# Patient Record
Sex: FEMALE | Race: AMERICAN INDIAN OR ALASKA NATIVE
[De-identification: names, ages, dates, MRNs, and addresses within clinical notes are randomized per-mention and may not be internally consistent; named-entity substitution may affect disease eponyms.]

---

## 2019-04-29 ENCOUNTER — HOSPITAL ENCOUNTER (OUTPATIENT)
Dept: HOSPITAL 5 - LAB | Age: 34
Discharge: HOME | End: 2019-04-29
Attending: INTERNAL MEDICINE
Payer: COMMERCIAL

## 2019-04-29 DIAGNOSIS — Z13.811: Primary | ICD-10-CM

## 2019-04-29 PROCEDURE — 85652 RBC SED RATE AUTOMATED: CPT

## 2019-04-29 PROCEDURE — 82784 ASSAY IGA/IGD/IGG/IGM EACH: CPT

## 2019-04-29 PROCEDURE — 36415 COLL VENOUS BLD VENIPUNCTURE: CPT

## 2020-03-05 NOTE — ULTRASOUND REPORT
COMPLETE BILATERAL BREAST ULTRASOUND 



HISTORY: 34-year-old with bilateral breast lumps. She describes a lump in the upper outer right breas
t for one month.



COMPARISON: None.



FINDINGS:  Complete sonographic evaluation of the right breast including imaging of the four quadrant
s and subareolar areas reveals no distinct abnormality. No mass, cyst or suspicious shadowing. Normal
 fibroglandular structures at 10:00 8 cm from the nipple correlate with the symptomatic area.



Complete sonographic evaluation of the left breast including imaging of the four quadrants and subare
olar areas reveals no mass or suspicious shadowing. A benign cyst at 11:30 o'clock 5 cm from the nipp
le measures 5 x 2 x 5 mm.



IMPRESSION:

Normal right breast and a single 5 mm left breast cyst at 11:30 o'clock 5 cm from the nipple.



If the clinical examination remains stable, recommend bilateral screening mammograms beginning annual
ly at age 40 per the current [employer] guidelines or at intervals appropriate for
 the patient's risk factors.



BIRADS 2:  Benign



Signer Name: Escobar Carrasquillo MD 

Signed: 3/5/2020 8:13 AM

Workstation Name: [id number]